# Patient Record
Sex: MALE | Race: WHITE | NOT HISPANIC OR LATINO | Employment: OTHER | URBAN - METROPOLITAN AREA
[De-identification: names, ages, dates, MRNs, and addresses within clinical notes are randomized per-mention and may not be internally consistent; named-entity substitution may affect disease eponyms.]

---

## 2020-07-18 ENCOUNTER — NURSE TRIAGE (OUTPATIENT)
Dept: ADMINISTRATIVE | Facility: CLINIC | Age: 40
End: 2020-07-18

## 2020-07-18 NOTE — TELEPHONE ENCOUNTER
COVID 19 Symptom Tracker Outreach:   Pt contacted through Covid Symptom tracking for an escalation of symptoms. Patient stated that he did not mean to respond to the text message. Reported that he recently had surgery and is having fever related to Pancreatitis. Reported that he has been tested for COID and is negative. Denies any coughing, SOB or chest pains. Declined triage for fever. Stated that he would contact his providers if he needs any additional assistance. Also encouraged to contact [facility] with any additional needs. Verbalized understanding.     Reason for Disposition   General information question, no triage required and triager able to answer question    Additional Information   Negative: RN needs further essential information from caller in order to complete triage    Protocols used: INFORMATION ONLY CALL - NO TRIAGE-A-